# Patient Record
Sex: FEMALE | Race: BLACK OR AFRICAN AMERICAN | Employment: UNEMPLOYED | URBAN - METROPOLITAN AREA
[De-identification: names, ages, dates, MRNs, and addresses within clinical notes are randomized per-mention and may not be internally consistent; named-entity substitution may affect disease eponyms.]

---

## 2019-06-05 ENCOUNTER — OFFICE VISIT (OUTPATIENT)
Dept: NEUROLOGY | Age: 34
End: 2019-06-05

## 2019-06-05 VITALS
SYSTOLIC BLOOD PRESSURE: 128 MMHG | DIASTOLIC BLOOD PRESSURE: 84 MMHG | RESPIRATION RATE: 20 BRPM | HEIGHT: 61 IN | OXYGEN SATURATION: 98 % | HEART RATE: 104 BPM

## 2019-06-05 DIAGNOSIS — G43.111 INTRACTABLE MIGRAINE WITH AURA WITH STATUS MIGRAINOSUS: Primary | ICD-10-CM

## 2019-06-05 RX ORDER — BUTALBITAL, ACETAMINOPHEN AND CAFFEINE 300; 40; 50 MG/1; MG/1; MG/1
CAPSULE ORAL
Refills: 0 | COMMUNITY
Start: 2019-04-24 | End: 2021-07-08 | Stop reason: ALTCHOICE

## 2019-06-05 RX ORDER — ACETAMINOPHEN 500 MG
TABLET ORAL
COMMUNITY

## 2019-06-05 NOTE — PROGRESS NOTES
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 HPI:     Since 15 yo, patient has been having headaches lasting for weeks, diffuse,10/10, unknown trigger, Excedrin usually helps(+) nausea (+) vomiting (+) photophobia (+) phonophobia (+) blurred vision    During her 1st 4 pregnancies, patient did not have any issues and no headaches    However, with her 5th pregnancy (21 weeks) starting at 10 weeks, patient noted headaches daily. Takes Fioricet with no benefit. Patient had a period of 2 weeks without headache, but had a [place] shot and since then have headaches. ROS   (-) fever  (-) rash  All other systems reviewed and are negative    Past Medical Hx  Past Medical History:   Diagnosis Date    Anxiety     Headache     Nausea & vomiting     Visual disturbance        Social Hx  Social History     Socioeconomic History    Marital status: UNKNOWN     Spouse name: Not on file    Number of children: Not on file    Years of education: Not on file    Highest education level: Not on file       Family Hx  No family history on file. ALLERGIES  Allergies   Allergen Reactions    Flagyl [Metronidazole] Hives       CURRENT MEDS  Current Outpatient Medications   Medication Sig Dispense Refill    butalbital-acetaminophen-caff (FIORICET) -40 mg per capsule PLEASE SEE ATTACHED FOR DETAILED DIRECTIONS  0    prenatal vit-iron fumarate-fa 27 mg iron- 0.8 mg tab tablet TAKE 1 TABLET BY MOUTH EVERY DAY  10    acetaminophen (TYLENOL EXTRA STRENGTH) 500 mg tablet Take  by mouth every six (6) hours as needed for Pain. PREVIOUS WORKUP: (reviewed)  IMAGING:    CT Results (recent):  No results found for this or any previous visit.     MRI Results (recent):  No results found for this or any previous visit. IR Results (recent):  No results found for this or any previous visit. VAS/US Results (recent):  No results found for this or any previous visit. LABS (reviewed)  No results found for this or any previous visit. Physical Exam:     Visit Vitals  /84 (BP 1 Location: Left arm, BP Patient Position: Sitting)   Pulse (!) 104   Resp 20   Ht 5' 1\" (1.549 m)   SpO2 98%     General:  Alert, cooperative, no distress. (+) PU 20 weeks   Head:  Normocephalic, without obvious abnormality, atraumatic. Eyes:  Conjunctivae/corneas clear. Lungs:  Heart:   Non labored breathing  Regular rate and rhythm, no carotid bruits   Abdomen:   Soft, non-distended   Extremities: Extremities normal, atraumatic, no cyanosis or edema. Pulses: 2+ and symmetric all extremities. Skin: Skin color, texture, turgor normal. No rashes or lesions. Neurologic Exam     Gen: Attention normal             Language: naming, repetition, fluency normal             Memory: intact recent and remote memory  Cranial Nerves:  I: smell Not tested   II: visual fields Full to confrontation   II: pupils Equal, round, reactive to light   II: optic disc No papilledema   III,VII: ptosis none   III,IV,VI: extraocular muscles  Full ROM   V: mastication normal   V: facial light touch sensation  normal   VII: facial muscle function   symmetric   VIII: hearing symmetric   IX: soft palate elevation  normal   XI: trapezius strength  5/5   XI: sternocleidomastoid strength 5/5   XI: neck flexion strength  5/5   XII: tongue  midline     Motor: normal bulk and tone, no tremor              Strength: 5/5 all four extremities  Sensory: intact to LT, PP, vibration, and JPS  Reflexes: 2+ throughout; Down going toes  Coordination: Good FTN and HTS  Gait: normal gait for pregnancy           Impression:     Alejandro Dubose is a 34 y.o. female who  has a past medical history of Anxiety, Headache, Nausea & vomiting, and Visual disturbance. who is PU 20 weeks who since 15 yo, has been having headaches lasting for weeks, diffuse,10/10, unknown trigger, Excedrin usually helps(+) nausea (+) vomiting (+) photophobia (+) phonophobia (+) blurred vision. During her 1st 4 pregnancies, patient did not have any issues and no headaches. However, with her 5th pregnancy (21 weeks) starting at 10 weeks, patient noted headaches daily. Takes Fioricet with no benefit. Patient had a recent period of 2 weeks without headache, but had a [place] shot (to prevent pre term labor) and since then have daily headaches. Considerations include exacerbation of migraines due to medication side effect (Maricruz). RECOMMENDATIONS  1. I had a long discussion with patient. Discussed diagnosis, prognosis, pathophysiology and available treatment. All questions were answered. 2. Discussed because of her pregnancy, our options are limited. We are hoping that if this is truly from the medication, her headache should improve on its own. Patient has decided she will not get any more of the [facility] shot. 3. Patient can continue to try Promethazine for nausea and also can help control her migraine. 4. Can continue Fioricet and Tylenol prn  5. Advise to call me if headaches do not improve after a couple of weeks and will try her on a short course of Prednisone to break headache cylce if okay with her OB-gyne      Thank you for the consultation      Thad Potter MD  Diplomate, American Board of Psychiatry and Neurology  Diplomate, Neuromuscular Medicine  Diplomate, American Board of Electrodiagnostic Medicine        CC: No primary care provider on file.   Fax: None

## 2019-06-05 NOTE — PATIENT INSTRUCTIONS
Migraine Headache: Care Instructions  Your Care Instructions  Migraines are painful, throbbing headaches that often start on one side of the head. They may cause nausea and vomiting and make you sensitive to light, sound, or smell. Without treatment, migraines can last from 4 hours to a few days. Medicines can help prevent migraines or stop them after they have started. Your doctor can help you find which ones work best for you. Follow-up care is a key part of your treatment and safety. Be sure to make and go to all appointments, and call your doctor if you are having problems. It's also a good idea to know your test results and keep a list of the medicines you take. How can you care for yourself at home? · Do not drive if you have taken a prescription pain medicine. · Rest in a quiet, dark room until your headache is gone. Close your eyes, and try to relax or go to sleep. Don't watch TV or read. · Put a cold, moist cloth or cold pack on the painful area for 10 to 20 minutes at a time. Put a thin cloth between the cold pack and your skin. · Use a warm, moist towel or a heating pad set on low to relax tight shoulder and neck muscles. · Have someone gently massage your neck and shoulders. · Take your medicines exactly as prescribed. Call your doctor if you think you are having a problem with your medicine. You will get more details on the specific medicines your doctor prescribes. · Be careful not to take pain medicine more often than the instructions allow. You could get worse or more frequent headaches when the medicine wears off. To prevent migraines  · Keep a headache diary so you can figure out what triggers your headaches. Avoiding triggers may help you prevent headaches. Record when each headache began, how long it lasted, and what the pain was like.  (Was it throbbing, aching, stabbing, or dull?) Write down any other symptoms you had with the headache, such as nausea, flashing lights or dark spots, or sensitivity to bright light or loud noise. Note if the headache occurred near your period. List anything that might have triggered the headache. Triggers may include certain foods (chocolate, cheese, wine) or odors, smoke, bright light, stress, or lack of sleep. · If your doctor has prescribed medicine for your migraines, take it as directed. You may have medicine that you take only when you get a migraine and medicine that you take all the time to help prevent migraines. ? If your doctor has prescribed medicine for when you get a headache, take it at the first sign of a migraine, unless your doctor has given you other instructions. ? If your doctor has prescribed medicine to prevent migraines, take it exactly as prescribed. Call your doctor if you think you are having a problem with your medicine. · Find healthy ways to deal with stress. Migraines are most common during or right after stressful times. Take time to relax before and after you do something that has caused a migraine in the past.  · Try to keep your muscles relaxed by keeping good posture. Check your jaw, face, neck, and shoulder muscles for tension. Try to relax them. When you sit at a desk, change positions often. And make sure to stretch for 30 seconds each hour. · Get plenty of sleep and exercise. · Eat meals on a regular schedule. Avoid foods and drinks that often trigger migraines. These include chocolate, alcohol (especially red wine and port), aspartame, monosodium glutamate (MSG), and some additives found in foods (such as hot dogs, riddle, cold cuts, aged cheeses, and pickled foods). · Limit caffeine. Don't drink too much coffee, tea, or soda. But don't quit caffeine suddenly. That can also give you migraines. · Do not smoke or allow others to smoke around you. If you need help quitting, talk to your doctor about stop-smoking programs and medicines. These can increase your chances of quitting for good.   · If you are taking birth control pills or hormone therapy, talk to your doctor about whether they are triggering your migraines. When should you call for help? Call 911 anytime you think you may need emergency care. For example, call if:    · You have signs of a stroke. These may include:  ? Sudden numbness, paralysis, or weakness in your face, arm, or leg, especially on only one side of your body. ? Sudden vision changes. ? Sudden trouble speaking. ? Sudden confusion or trouble understanding simple statements. ? Sudden problems with walking or balance. ? A sudden, severe headache that is different from past headaches.    Call your doctor now or seek immediate medical care if:    · You have new or worse nausea and vomiting.     · You have a new or higher fever.     · Your headache gets much worse.    Watch closely for changes in your health, and be sure to contact your doctor if:    · You are not getting better after 2 days (48 hours). Where can you learn more? Go to http://madison-gerald.info/. Enter B175 in the search box to learn more about \"Migraine Headache: Care Instructions. \"  Current as of: Goldie 3, 2018  Content Version: 11.9  © 1991-9231 [employer]. Care instructions adapted under license by [employer] (which disclaims liability or warranty for this information). If you have questions about a medical condition or this instruction, always ask your healthcare professional. [address] any warranty or liability for your use of this information.

## 2019-06-05 NOTE — LETTER
6/5/2019 11:36 AM 
 
Patient:  Larry Phipps YOB: 1985 Date of Visit: 6/5/2019 Dear Saray Shepard MD 
[address] [facility] [id number] VIA Facsimile: [phone] 
 : Thank you for referring Ms. Larry Phipps to me for evaluation/treatment. Below are the relevant portions of my assessment and plan of care. If you have questions, please do not hesitate to call me. I look forward to following Ms. Loya along with you. Sincerely, Dez Damon MD

## 2019-06-05 NOTE — PROGRESS NOTES
Patient is here for migraines,    She is currently pregnant (21 weeks). She has been getting a migraine for a while now, and medications do not seem to help. They came back up when she was 10 weeks.

## 2021-03-29 ENCOUNTER — HOSPITAL ENCOUNTER (EMERGENCY)
Age: 36
Discharge: HOME | End: 2021-03-29
Attending: EMERGENCY MEDICINE
Payer: MEDICAID

## 2021-03-29 VITALS
HEART RATE: 89 BPM | WEIGHT: 157 LBS | HEIGHT: 61 IN | SYSTOLIC BLOOD PRESSURE: 144 MMHG | DIASTOLIC BLOOD PRESSURE: 79 MMHG | OXYGEN SATURATION: 99 % | BODY MASS INDEX: 29.64 KG/M2 | TEMPERATURE: 98.1 F | RESPIRATION RATE: 20 BRPM

## 2021-03-29 DIAGNOSIS — R55 VASOVAGAL REACTION: Primary | ICD-10-CM

## 2021-03-29 DIAGNOSIS — F45.8 HYPERVENTILATION SYNDROME: ICD-10-CM

## 2021-03-29 LAB
ANION GAP SERPL CALC-SCNC: 10 MMOL/L (ref 5–15)
APPEARANCE UR: CLEAR
BACTERIA URNS QL MICRO: NEGATIVE /HPF
BASOPHILS # BLD: 0 K/UL (ref 0–0.1)
BASOPHILS NFR BLD: 0 % (ref 0–1)
BILIRUB UR QL: NEGATIVE
BUN SERPL-MCNC: 14 MG/DL (ref 6–20)
BUN/CREAT SERPL: 16 (ref 12–20)
CA-I BLD-MCNC: 9 MG/DL (ref 8.5–10.1)
CHLORIDE SERPL-SCNC: 101 MMOL/L (ref 97–108)
CO2 SERPL-SCNC: 27 MMOL/L (ref 21–32)
COLOR UR: YELLOW
CREAT SERPL-MCNC: 0.88 MG/DL (ref 0.55–1.02)
DIFFERENTIAL METHOD BLD: NORMAL
EOSINOPHIL # BLD: 0.1 K/UL (ref 0–0.4)
EOSINOPHIL NFR BLD: 1 % (ref 0–7)
EPITH CASTS URNS QL MICRO: ABNORMAL /LPF
ERYTHROCYTE [DISTWIDTH] IN BLOOD BY AUTOMATED COUNT: 12.9 % (ref 11.5–14.5)
GLUCOSE SERPL-MCNC: 105 MG/DL (ref 65–100)
GLUCOSE UR STRIP.AUTO-MCNC: NEGATIVE MG/DL
HCG UR QL: NEGATIVE
HCT VFR BLD AUTO: 36.5 % (ref 35–47)
HGB BLD-MCNC: 11.9 G/DL (ref 11.5–16)
HGB UR QL STRIP: NEGATIVE
IMM GRANULOCYTES # BLD AUTO: 0 K/UL (ref 0–0.04)
IMM GRANULOCYTES NFR BLD AUTO: 0 % (ref 0–0.5)
KETONES UR QL STRIP.AUTO: NEGATIVE MG/DL
LEUKOCYTE ESTERASE UR QL STRIP.AUTO: NEGATIVE
LYMPHOCYTES # BLD: 1.8 K/UL (ref 0.8–3.5)
LYMPHOCYTES NFR BLD: 22 % (ref 12–49)
MCH RBC QN AUTO: 28.6 PG (ref 26–34)
MCHC RBC AUTO-ENTMCNC: 32.6 G/DL (ref 30–36.5)
MCV RBC AUTO: 87.7 FL (ref 80–99)
MONOCYTES # BLD: 0.4 K/UL (ref 0–1)
MONOCYTES NFR BLD: 5 % (ref 5–13)
NEUTS SEG # BLD: 5.8 K/UL (ref 1.8–8)
NEUTS SEG NFR BLD: 72 % (ref 32–75)
NITRITE UR QL STRIP.AUTO: NEGATIVE
PH UR STRIP: 7 [PH] (ref 5–8)
PLATELET # BLD AUTO: 289 K/UL (ref 150–400)
PMV BLD AUTO: 10.5 FL (ref 8.9–12.9)
POTASSIUM SERPL-SCNC: 4 MMOL/L (ref 3.5–5.1)
PROT UR STRIP-MCNC: NEGATIVE MG/DL
RBC # BLD AUTO: 4.16 M/UL (ref 3.8–5.2)
RBC #/AREA URNS HPF: ABNORMAL /HPF (ref 0–5)
SODIUM SERPL-SCNC: 138 MMOL/L (ref 136–145)
SP GR UR REFRACTOMETRY: 1 (ref 1–1.03)
UROBILINOGEN UR QL STRIP.AUTO: 0.1 EU/DL (ref 0.2–1)
WBC # BLD AUTO: 8 K/UL (ref 3.6–11)
WBC URNS QL MICRO: ABNORMAL /HPF (ref 0–4)

## 2021-03-29 PROCEDURE — 99283 EMERGENCY DEPT VISIT LOW MDM: CPT

## 2021-03-29 PROCEDURE — 93005 ELECTROCARDIOGRAM TRACING: CPT

## 2021-03-29 PROCEDURE — 81025 URINE PREGNANCY TEST: CPT

## 2021-03-29 PROCEDURE — 81003 URINALYSIS AUTO W/O SCOPE: CPT

## 2021-03-29 PROCEDURE — 85025 COMPLETE CBC W/AUTO DIFF WBC: CPT

## 2021-03-29 PROCEDURE — 80048 BASIC METABOLIC PNL TOTAL CA: CPT

## 2021-03-29 PROCEDURE — 36415 COLL VENOUS BLD VENIPUNCTURE: CPT

## 2021-03-29 NOTE — LETTER
NOTIFICATION RETURN TO WORK / SCHOOL 
 
3/29/2021 11:49 PM 
 
Ms. Vale Helms [address] [address] To Whom It May Concern: 
 
Vale Helms is currently under the care of [address] [place] [address]. She will return to work/school on: 3/31/21 Vale Helms may return to work/school with the following restrictions: n/a If there are questions or concerns please have the patient contact our office.  
 
 
 
Sincerely, 
 
 
Willim Closs, RN

## 2021-03-30 NOTE — ED PROVIDER NOTES
EMERGENCY DEPARTMENT HISTORY AND PHYSICAL EXAM      Date: 3/29/2021  Patient Name: Mehran Santos    History of Presenting Illness     Chief Complaint   Patient presents with    Neck Pain    Headache    Numbness       History Provided By: Patient    HPI: Mehran Santos, 37 y.o. female   presents to the ED with cc of dizziness and numbness. Patient states that she had a sudden onset of dizziness while she was driving about an hour prior to arrival.  The dizziness is described as a lightheaded feeling with neck stiffness and numbness. Numbness is localized periorbital and bilateral hands. Patient denies anxiety but relates history of hyperventilation where she had shortness of breath, chest pain, and rapid breathing. Patient is currently having mild headache without visual changes or neuro deficit. Patient denies use of EtOH or drug. Patient has history of anemia that required iron infusion in past      PCP: None    No current facility-administered medications on file prior to encounter. No current outpatient medications on file prior to encounter. Past History     Past Medical History:  Past Medical History:   Diagnosis Date    Anxiety     Chronic headache     Intermittent palpitations        Past Surgical History:  History reviewed. No pertinent surgical history. Family History:  History reviewed. No pertinent family history. Social History:  Social History     Tobacco Use    Smoking status: Not on file   Substance Use Topics    Alcohol use: Not on file    Drug use: Not on file       Allergies: Allergies   Allergen Reactions    Flagyl [Metronidazole] Rash         Review of Systems   Review of Systems   Constitutional: Negative for activity change, appetite change, chills and fever. HENT: Negative for sore throat. Eyes: Negative for discharge. Respiratory: Positive for shortness of breath. Cardiovascular: Positive for chest pain. Gastrointestinal: Negative for nausea. Endocrine: Negative for polyuria. Genitourinary: Negative for difficulty urinating. Musculoskeletal: Negative for arthralgias. Skin: Negative for rash. Neurological: Positive for headaches. Hematological: Negative for adenopathy. Psychiatric/Behavioral: Negative for suicidal ideas. All other systems reviewed and are negative. Physical Exam   Physical Exam  Vitals signs and nursing note reviewed. Constitutional:       Appearance: Normal appearance. HENT:      Head: Normocephalic and atraumatic. Nose: Nose normal.      Mouth/Throat:      Mouth: Mucous membranes are moist.      Pharynx: Oropharynx is clear. Eyes:      Extraocular Movements: Extraocular movements intact. Conjunctiva/sclera: Conjunctivae normal.      Pupils: Pupils are equal, round, and reactive to light. Neck:      Musculoskeletal: Neck supple. No neck rigidity or muscular tenderness. Cardiovascular:      Rate and Rhythm: Normal rate and regular rhythm. Heart sounds: Normal heart sounds. Pulmonary:      Effort: Pulmonary effort is normal.      Breath sounds: Normal breath sounds. Abdominal:      General: Abdomen is flat. Bowel sounds are normal.      Palpations: Abdomen is soft. Musculoskeletal:      Right lower leg: No edema. Left lower leg: No edema. Lymphadenopathy:      Cervical: No cervical adenopathy. Skin:     General: Skin is warm and dry. Neurological:      General: No focal deficit present. Mental Status: She is alert and oriented to person, place, and time. Cranial Nerves: No cranial nerve deficit. Motor: No weakness.       Gait: Gait normal.   Psychiatric:         Mood and Affect: Mood normal.         Diagnostic Study Results     Labs -     Recent Results (from the past 12 hour(s))   CBC WITH AUTOMATED DIFF    Collection Time: 03/29/21 11:00 PM   Result Value Ref Range    WBC 8.0 3.6 - 11.0 K/uL    RBC 4.16 3.80 - 5.20 M/uL    HGB 11.9 11.5 - 16.0 g/dL    HCT 36.5 35.0 - 47.0 %    MCV 87.7 80.0 - 99.0 FL    MCH 28.6 26.0 - 34.0 PG    MCHC 32.6 30.0 - 36.5 g/dL    RDW 12.9 11.5 - 14.5 %    PLATELET 545 800 - 088 K/uL    MPV 10.5 8.9 - 12.9 FL    NEUTROPHILS 72 32 - 75 %    LYMPHOCYTES 22 12 - 49 %    MONOCYTES 5 5 - 13 %    EOSINOPHILS 1 0 - 7 %    BASOPHILS 0 0 - 1 %    IMMATURE GRANULOCYTES 0 0.0 - 0.5 %    ABS. NEUTROPHILS 5.8 1.8 - 8.0 K/UL    ABS. LYMPHOCYTES 1.8 0.8 - 3.5 K/UL    ABS. MONOCYTES 0.4 0.0 - 1.0 K/UL    ABS. EOSINOPHILS 0.1 0.0 - 0.4 K/UL    ABS. BASOPHILS 0.0 0.0 - 0.1 K/UL    ABS. IMM.  GRANS. 0.0 0.00 - 0.04 K/UL    DF AUTOMATED     METABOLIC PANEL, BASIC    Collection Time: 03/29/21 11:00 PM   Result Value Ref Range    Sodium 138 136 - 145 mmol/L    Potassium 4.0 3.5 - 5.1 mmol/L    Chloride 101 97 - 108 mmol/L    CO2 27 21 - 32 mmol/L    Anion gap 10 5 - 15 mmol/L    Glucose 105 (H) 65 - 100 mg/dL    BUN 14 6 - 20 mg/dL    Creatinine 0.88 0.55 - 1.02 mg/dL    BUN/Creatinine ratio 16 12 - 20      GFR est AA >60 >60 ml/min/1.73m2    GFR est non-AA >60 >60 ml/min/1.73m2    Calcium 9.0 8.5 - 10.1 mg/dL   URINALYSIS W/ RFLX MICROSCOPIC    Collection Time: 03/29/21 11:00 PM   Result Value Ref Range    Color Yellow      Appearance Clear Clear      Specific gravity 1.005 1.003 - 1.030      pH (UA) 7.0 5.0 - 8.0      Protein Negative Negative mg/dL    Glucose Negative Negative mg/dL    Ketone Negative Negative mg/dL    Bilirubin Negative Negative      Blood Negative Negative      Urobilinogen 0.1 (L) 0.2 - 1.0 EU/dL    Nitrites Negative Negative      Leukocyte Esterase Negative Negative      WBC 0-4 0 - 4 /hpf    RBC 0-5 0 - 5 /hpf    Epithelial cells Few Few /lpf    Bacteria Negative Negative /hpf   HCG URINE, QL    Collection Time: 03/29/21 11:00 PM   Result Value Ref Range    HCG urine, QL Negative Negative         Radiologic Studies -   No orders to display     CT Results  (Last 48 hours)    None        CXR Results  (Last 48 hours)    None            Medical Decision Making   I am the first provider for this patient. I reviewed the vital signs, available nursing notes, past medical history, past surgical history, family history and social history. Vital Signs-Reviewed the patient's vital signs. Patient Vitals for the past 12 hrs:   Temp Pulse Resp BP SpO2   03/29/21 2208 98.1 °F (36.7 °C) 89 20 (!) 144/79 99 %       Records Reviewed:     Provider Notes (Medical Decision Making):       ED Course:   Initial assessment performed. The patients presenting problems have been discussed, and they are in agreement with the care plan formulated and outlined with them. I have encouraged them to ask questions as they arise throughout their visit. ED Course as of Mar 29 2341   Mon Mar 29, 2021   2319 EKG normal sinus rhythm at 87 normal QRS QT normal axis no ectopy no ischemic changes no STEMI    [SK]      ED Course User Index  [SK] Haily Henson MD     Patient feels better. Symptoms resolved. Neuro no gross nonfocal.  No respiratory distress. PROCEDURES      Disposition: Condition stable   DC- Adult Discharges: All of the diagnostic tests were reviewed and questions answered. Diagnosis, care plan and treatment options were discussed. understand instructions and will follow up as directed. The patients results have been reviewed with them. They have been counseled regarding their diagnosis. The patient verbally convey understanding and agreement of the signs, symptoms, diagnosis, treatment and prognosis and additionally agrees to follow up as recommended. They also agree with the care-plan and convey that all of their questions have been answered. I have also put together some discharge instructions for them that include: 1) educational information regarding their diagnosis, 2) how to care for their diagnosis at home, as well a 3) list of reasons why they would want to return to the ED prior to their follow-up appointment, should their condition change. PLAN:  1. There are no discharge medications for this patient. 2.   Follow-up Information     Follow up With Specialties Details Why Contact Info    Follow up with your primary care physician  Schedule an appointment as soon as possible for a visit in 3 days As needed         Return to ED if worse     Diagnosis     Clinical Impression:   1. Vasovagal reaction    2. Hyperventilation syndrome        Please note that this dictation was completed with [employer], the computer voice recognition software. Quite often unanticipated grammatical, syntax, homophones, and other interpretive errors are inadvertently transcribed by the computer software. Please disregard these errors. Please excuse any errors that have escaped final proofreading. Thank you.

## 2021-05-24 ENCOUNTER — OFFICE VISIT (OUTPATIENT)
Dept: FAMILY MEDICINE CLINIC | Age: 36
End: 2021-05-24
Payer: MEDICAID

## 2021-05-24 VITALS
HEIGHT: 63 IN | TEMPERATURE: 97.7 F | SYSTOLIC BLOOD PRESSURE: 120 MMHG | OXYGEN SATURATION: 97 % | BODY MASS INDEX: 27.91 KG/M2 | WEIGHT: 157.5 LBS | DIASTOLIC BLOOD PRESSURE: 70 MMHG | HEART RATE: 76 BPM

## 2021-05-24 DIAGNOSIS — Z11.59 SCREENING FOR VIRAL DISEASE: ICD-10-CM

## 2021-05-24 DIAGNOSIS — E66.3 OVERWEIGHT WITH BODY MASS INDEX (BMI) OF 28 TO 28.9 IN ADULT: ICD-10-CM

## 2021-05-24 DIAGNOSIS — Z13.220 SCREENING FOR LIPOID DISORDERS: ICD-10-CM

## 2021-05-24 DIAGNOSIS — R42 DIZZINESS: Primary | ICD-10-CM

## 2021-05-24 DIAGNOSIS — F41.0 PANIC: ICD-10-CM

## 2021-05-24 PROCEDURE — 99204 OFFICE O/P NEW MOD 45 MIN: CPT | Performed by: FAMILY MEDICINE

## 2021-05-24 RX ORDER — IBUPROFEN 200 MG
400 TABLET ORAL AS NEEDED
COMMUNITY

## 2021-05-24 RX ORDER — SERTRALINE HYDROCHLORIDE 50 MG/1
50 TABLET, FILM COATED ORAL DAILY
Qty: 30 TABLET | Refills: 1 | Status: SHIPPED | OUTPATIENT
Start: 2021-05-24 | End: 2021-07-08 | Stop reason: ALTCHOICE

## 2021-05-24 NOTE — PROGRESS NOTES
Chief Complaint   Patient presents with   Sukhjinder Carbajal [facility]    Dizziness     x4 months, feels pressure in her head towards the front, no better with med, Panic attacks? 1. Have you been to the ER, urgent care clinic since your last visit? Hospitalized since your last visit? Yes When: 2/2021 Where: [facility] ED Reason for visit: Panic attack/headache. 2. Have you seen or consulted any other health care providers outside of the [address] [place] [address] Jason since your last visit? Include any pap smears or colon screening.  No

## 2021-05-24 NOTE — PROGRESS NOTES
Subjective  Chief Complaint   Patient presents with    Establish Care    Dizziness     x4 months, feels pressure in her head towards the front, no better with med, Panic attacks? HPI:  Karen Gonzales is a 37 y.o. female. About a year ago when she moved into her current home she started having panic attacks. They are occurring about twice per month. Has been to the ED 3 times this year but nothing is found. The dizziness and pressure in her head has been present for about a year. She has a h/o migraines but this is different. Has not had a migraine since being pregnant 2 yrs ago. The dizziness and pressure is daily. Ibuprofen helps temporarily. Symptoms are present in all positions but gets worse with laying down. Denies sinus congestion. Vision is blurry with headaches. Eyes last checked about a year ago. About 3 days/week she will get some numbness down her left side of her face with the dizziness. 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 She has 5 children that are all virtual right now.      Past Medical History:   Diagnosis Date    Anemia     Had to get iron infusions while pregnant    Anxiety     Chronic headache     Intermittent palpitations      Family History   Problem Relation Age of Onset    Anemia Mother     No Known Problems Father     Hypertension Maternal Grandmother     High Cholesterol Maternal Grandmother      Social History     Socioeconomic History    Marital status:      Spouse name: Not on file    Number of children: Not on file    Years of education: Not on file    Highest education level: Not on file   Occupational History    Not on file   Tobacco Use    Smoking status: Never Smoker    Smokeless tobacco: Never Used   Vaping Use    Vaping Use: Former    Substances: THC, CBD    Devices: Disposable   Substance and Sexual Activity    Alcohol use: Not Currently    Drug use: Not Currently    Sexual activity: Not on file   Other Topics Concern    Not on file   Social History Narrative    Not on file     Social Determinants of Health     Financial Resource Strain:     Difficulty of Paying Living Expenses:    Food Insecurity:     Worried About Running Out of Food in the Last Year:     [address] in the Last Year:    Transportation Needs:     Lack of Transportation (Medical):  Lack of Transportation (Non-Medical):    Physical Activity:     Days of Exercise per Week:     Minutes of Exercise per Session:    Stress:     Feeling of Stress :    Social Connections:     Frequency of Communication with Friends and Family:     Frequency of Social Gatherings with Friends and Family:     Attends Religion Services:     Active Member of Clubs or Organizations:     Attends Club or Organization Meetings:     Marital Status:    Intimate Partner Violence:     Fear of Current or Ex-Partner:     Emotionally Abused:     Physically Abused:     Sexually Abused:      Current Outpatient Medications on File Prior to Visit   Medication Sig Dispense Refill    ibuprofen (MOTRIN) 200 mg tablet Take 400 mg by mouth as needed (Headache). No current facility-administered medications on file prior to visit. Allergies   Allergen Reactions    Flagyl [Metronidazole] Rash       Objective  Visit Vitals  /70 (BP 1 Location: Left upper arm, BP Patient Position: Sitting)   Pulse 76   Temp 97.7 °F (36.5 °C) (Temporal)   Ht 5' 2.5\" (1.588 m)   Wt 157 lb 8 oz (71.4 kg)   SpO2 97%   BMI 28.35 kg/m²     Physical Exam  Constitutional:       Appearance: Normal appearance. She is overweight. HENT:      Head: Normocephalic and atraumatic. Eyes:      Extraocular Movements: Extraocular movements intact. Pupils: Pupils are equal, round, and reactive to light. Pulmonary:      Effort: Pulmonary effort is normal.   Musculoskeletal:      Cervical back: Normal range of motion. Neurological:      General: No focal deficit present.       Mental Status: She is alert and oriented to person, place, and time. Cranial Nerves: Cranial nerves are intact. No cranial nerve deficit or facial asymmetry. Sensory: Sensation is intact. Motor: Motor function is intact. No weakness, tremor or pronator drift. Coordination: Coordination is intact. Romberg sign negative. Coordination normal. Finger-Nose-Finger Test and Heel to [facility] Test normal. Rapid alternating movements normal.      Gait: Gait is intact. Gait and tandem walk normal.      Deep Tendon Reflexes: Reflexes normal.      Reflex Scores:       Patellar reflexes are 2+ on the right side and 2+ on the left side. Psychiatric:         Mood and Affect: Mood normal.         Behavior: Behavior normal.          Assessment & Plan    ICD-10-CM ICD-9-CM    1. Dizziness  R42 780.4 VITAMIN D, 25 HYDROXY   2. Panic  F41.0 300.01 THYROID CASCADE PROFILE      sertraline (ZOLOFT) 50 mg tablet   3. Overweight with body mass index (BMI) of 28 to 28.9 in adult  E66.3 278.02     Z68.28 V85.24    4. Screening for lipoid disorders  Z13.220 V77.91 LIPID PANEL      METABOLIC PANEL, COMPREHENSIVE   5. Screening for viral disease  Z11.59 V73.99 HCV AB W/RFLX TO YUMI     Diagnoses and all orders for this visit:    1. Dizziness  -     VITAMIN D, 25 HYDROXY  I am checking vitamin D and TSH levels along with CMP and lipids. I reviewed her CBC that was just checked recently from the emergency department at [address] [place] [address]. I am hopeful that some of these headache and dizziness symptoms will improve with the SSRI therapy. I will see her back in the office in 6 weeks if symptoms are all not fully to goal.    2. Panic  -     THYROID CASCADE PROFILE  -     sertraline (ZOLOFT) 50 mg tablet; Take 1 Tablet by mouth daily. We are checking thyroid level and starting sertraline as listed. We reviewed how to take this medication and what to expect.   I will see her back in 6 weeks if not fully to goal.    3. Overweight with body mass index (BMI) of 28 to 28.9 in adult  Checking labs as listed. We will discuss lifestyle improvements more at future visit. I am checking her lipid panel with the other labs due to the overweight history. 4. Screening for lipoid disorders  -     LIPID PANEL  -     METABOLIC PANEL, COMPREHENSIVE    5. Screening for viral disease  -     HCV AB W/RFLX TO YUMI      Follow-up and Dispositions    · Return in about 6 weeks (around 7/5/2021) for f/u panic and dizziness.  Fouzia Kilpatrick MD

## 2021-05-28 LAB
25(OH)D3+25(OH)D2 SERPL-MCNC: 17.3 NG/ML (ref 30–100)
ALBUMIN SERPL-MCNC: 4.5 G/DL (ref 3.8–4.8)
ALBUMIN/GLOB SERPL: 1.6 {RATIO} (ref 1.2–2.2)
ALP SERPL-CCNC: 106 IU/L (ref 48–121)
ALT SERPL-CCNC: 11 IU/L (ref 0–32)
AST SERPL-CCNC: 14 IU/L (ref 0–40)
BILIRUB SERPL-MCNC: 0.5 MG/DL (ref 0–1.2)
BUN SERPL-MCNC: 15 MG/DL (ref 6–20)
BUN/CREAT SERPL: 20 (ref 9–23)
CALCIUM SERPL-MCNC: 9.4 MG/DL (ref 8.7–10.2)
CHLORIDE SERPL-SCNC: 105 MMOL/L (ref 96–106)
CHOLEST SERPL-MCNC: 174 MG/DL (ref 100–199)
CO2 SERPL-SCNC: 22 MMOL/L (ref 20–29)
CREAT SERPL-MCNC: 0.75 MG/DL (ref 0.57–1)
GLOBULIN SER CALC-MCNC: 2.9 G/DL (ref 1.5–4.5)
GLUCOSE SERPL-MCNC: 94 MG/DL (ref 65–99)
HCV AB S/CO SERPL IA: <0.1 S/CO RATIO (ref 0–0.9)
HCV AB SERPL QL IA: NORMAL
HDLC SERPL-MCNC: 50 MG/DL
LDLC SERPL CALC-MCNC: 114 MG/DL (ref 0–99)
POTASSIUM SERPL-SCNC: 4.2 MMOL/L (ref 3.5–5.2)
PROT SERPL-MCNC: 7.4 G/DL (ref 6–8.5)
SODIUM SERPL-SCNC: 139 MMOL/L (ref 134–144)
TRIGL SERPL-MCNC: 52 MG/DL (ref 0–149)
TSH SERPL DL<=0.005 MIU/L-ACNC: 0.7 UIU/ML (ref 0.45–4.5)
VLDLC SERPL CALC-MCNC: 10 MG/DL (ref 5–40)

## 2021-05-30 DIAGNOSIS — E55.9 VITAMIN D DEFICIENCY: Primary | ICD-10-CM

## 2021-05-30 RX ORDER — ACETAMINOPHEN 500 MG
2000 TABLET ORAL DAILY
Qty: 90 CAPSULE | Refills: 3 | Status: SHIPPED | OUTPATIENT
Start: 2021-05-30 | End: 2022-10-31 | Stop reason: ALTCHOICE

## 2021-05-30 RX ORDER — ERGOCALCIFEROL 1.25 MG/1
50000 CAPSULE ORAL
Qty: 8 CAPSULE | Refills: 0 | Status: SHIPPED | OUTPATIENT
Start: 2021-05-30 | End: 2022-10-31

## 2021-07-08 ENCOUNTER — VIRTUAL VISIT (OUTPATIENT)
Dept: FAMILY MEDICINE CLINIC | Age: 36
End: 2021-07-08
Payer: MEDICAID

## 2021-07-08 DIAGNOSIS — R07.9 CHEST PAIN, UNSPECIFIED TYPE: ICD-10-CM

## 2021-07-08 DIAGNOSIS — F41.0 PANIC: Primary | ICD-10-CM

## 2021-07-08 DIAGNOSIS — R42 DIZZINESS: ICD-10-CM

## 2021-07-08 PROCEDURE — 99214 OFFICE O/P EST MOD 30 MIN: CPT | Performed by: FAMILY MEDICINE

## 2021-07-08 NOTE — PROGRESS NOTES
Chief Complaint   Patient presents with    Follow-up     6 week f/u on panic attacks and dizziness, patient went to [facility] ER and had CT done showed Idopathic Hypertension which has made her anxiety worse worring about this new DX    1. Have you been to the ER, urgent care clinic since your last visit? Hospitalized since your last visit? Yes patient was seen at [facility] ER had CT done     2. Have you seen or consulted any other health care providers outside of the [address] [place] [address] since your last visit? Include any pap smears or colon screening.  Yes patient has seen neurology for Idiopathic hypertension     Patient would like to use # [phone] for her visit

## 2021-07-08 NOTE — PROGRESS NOTES
Consent: Annette Chavez, who was seen by synchronous (real-time) audio-video technology, and/or her healthcare decision maker, is aware that this patient-initiated, Telehealth encounter on 7/8/2021 is a billable service, with coverage as determined by her insurance carrier. She is aware that she may receive a bill and has provided verbal consent to proceed: YES-Consent obtained within past 12 months        712  Subjective:   Annette Chavez is a 37 y.o. female who was seen for Follow-up (6 week f/u on panic attacks and dizziness, patient went to [facility] ER and had CT done showed Idopathic Hypertension which has made her anxiety worse worring about this new DX )      Patient is following up on the anxiety episodes and dizziness that she had been seen for 6 weeks ago. She started the sertraline as directed and took it for about 2 to 3 weeks. She noticed that she was having increased episodes of panic in total about 4 during that time. She ended up going to the emergency department during that time and reports to us that a CT scan showed increased pressure on the brain. She since saw the neurologist, Dr. Ry Hood.  He has her scheduled for a spinal tap. She does not feel comfortable with this procedure. She has also seen cardiology for some chest pain that she was having. She just completed the Holter monitor and mailed that back in. She will also be having a stress test.  As for the anxiety itself, it has improved somewhat since coming off the sertraline. She has only had 2 mild episodes in the last few weeks that were manageable. She would like to remain off any medication for the anxiety at this time. Prior to Admission medications    Medication Sig Start Date End Date Taking? Authorizing Provider   ergocalciferol (ERGOCALCIFEROL) 1,250 mcg (50,000 unit) capsule Take 1 Capsule by mouth every seven (7) days. After 8 weeks, convert to 2000 unit capsule daily.  5/30/21  Yes Lucy Garcia MD ibuprofen (MOTRIN) 200 mg tablet Take 400 mg by mouth as needed (Headache). Yes Provider, Historical   acetaminophen (TYLENOL EXTRA STRENGTH) 500 mg tablet Take  by mouth every six (6) hours as needed for Pain. Yes Provider, Historical   cholecalciferol (VITAMIN D3) (2,000 UNITS /50 MCG) cap capsule Take 1 Capsule by mouth daily. 5/30/21   Soila Minor MD   sertraline (ZOLOFT) 50 mg tablet Take 1 Tablet by mouth daily. Patient not taking: Reported on 7/8/2021 5/24/21 7/8/21  Soila Minor MD   butalbital-acetaminophen-caff (FIORICET) -40 mg per capsule PLEASE SEE ATTACHED FOR DETAILED DIRECTIONS  Patient not taking: Reported on 7/8/2021 4/24/19 7/8/21  Provider, Historical   prenatal vit-iron fumarate-fa 27 mg iron- 0.8 mg tab tablet TAKE 1 TABLET BY MOUTH EVERY DAY  Patient not taking: Reported on 7/8/2021 3/19/19 7/8/21  Provider, Historical     Allergies   Allergen Reactions    Flagyl [Metronidazole] Hives    Flagyl [Metronidazole] Rash     Patient Active Problem List    Diagnosis    Vitamin D deficiency    Overweight with body mass index (BMI) of 28 to 28.9 in adult    Panic       Objective:   Vital Signs: (As obtained by patient/caregiver at home)  There were no vitals taken for this visit.      [INSTRUCTIONS:  \"[x]\" Indicates a positive item  \"[]\" Indicates a negative item  -- DELETE ALL ITEMS NOT EXAMINED]    Constitutional: [x] Appears well-developed and well-nourished [x] No apparent distress      [] Abnormal -     Mental status: [x] Alert and awake  [x] Oriented to person/place/time [x] Able to follow commands    [] Abnormal -     Eyes:   EOM    [x]  Normal    [] Abnormal -   Sclera  [x]  Normal    [] Abnormal -          Discharge [x]  None visible   [] Abnormal -     HENT: [x] Normocephalic, atraumatic  [] Abnormal -   [] Mouth/Throat: Mucous membranes are moist    External Ears [] Normal  [] Abnormal -    Neck: [x] No visualized mass [] Abnormal -     Pulmonary/Chest: [x] Respiratory effort normal   [x] No visualized signs of difficulty breathing or respiratory distress        [] Abnormal -        Neurological:        [x] No Facial Asymmetry (Cranial nerve 7 motor function) (limited exam due to video visit)          [x] No gaze palsy        [] Abnormal -          Skin:        [x] No significant exanthematous lesions or discoloration noted on facial skin         [] Abnormal -            Psychiatric:       [x] Normal Affect [] Abnormal -        [x] No Hallucinations    Other pertinent observable physical exam findings:-              Assessment & Plan:   Diagnoses and all orders for this visit:    1. Panic    2. Dizziness    3. Chest pain, unspecified type    We attempted today to get a copy of the CT report and emergency department encounter note to no avail. We are going to call the neurology office and request that note. I have reviewed the note from cardiology which is in her chart here today. I agree that if the anxiety symptoms are manageable at this time that it would be better to avoid starting any new medication. My concern would be that the potential effect could confuse the work-up or other issues. I did review with her what it means to have a spinal tap and how the similarities overlie from when she had her epidurals during her 5 childbirth episodes. We also reviewed how this is the only true way to get a reading for exactly how much pressure is occurring within her central nervous system. This has helped ease her anxiety over having the procedure. She will let me know if she has any other concerns that she would like to discuss. On this date 7/8/2021 I have spent 32 minutes reviewing previous notes, test results and face to face with the patient discussing the diagnosis and treatment plan. We discussed the expected course, resolution and complications of the diagnosis(es) in detail.   Medication risks, benefits, costs, interactions, and alternatives were discussed as indicated. I advised her to contact the office if her condition worsens, changes or fails to improve as anticipated. She expressed understanding with the diagnosis(es) and plan. Kim Sow is a 37 y.o. female being evaluated by a video visit encounter for concerns as above. A caregiver was present when appropriate. Due to this being a TeleHealth encounter (During [facility]- public health emergency), evaluation of the following organ systems was limited: Vitals/Constitutional/EENT/Resp/CV/GI//MS/Neuro/Skin/Heme-Lymph-Imm. Pursuant to the emergency declaration under the [facility], 1135 waiver authority and the [employer] Act, this Virtual  Visit was conducted, with patient's (and/or legal guardian's) consent, to reduce the patient's risk of exposure to COVID-19 and provide necessary medical care. Services were provided through a video synchronous discussion virtually to substitute for in-person clinic visit. Patient and provider were located at their individual homes.         Nish Betts MD

## 2022-03-18 PROBLEM — E55.9 VITAMIN D DEFICIENCY: Status: ACTIVE | Noted: 2021-05-30

## 2022-03-19 PROBLEM — E66.3 OVERWEIGHT WITH BODY MASS INDEX (BMI) OF 28 TO 28.9 IN ADULT: Status: ACTIVE | Noted: 2021-05-24

## 2022-03-19 PROBLEM — F41.0 PANIC: Status: ACTIVE | Noted: 2021-05-24

## 2022-10-31 ENCOUNTER — OFFICE VISIT (OUTPATIENT)
Dept: ENDOCRINOLOGY | Age: 37
End: 2022-10-31
Payer: MEDICAID

## 2022-10-31 VITALS
WEIGHT: 163.4 LBS | HEART RATE: 88 BPM | HEIGHT: 63 IN | DIASTOLIC BLOOD PRESSURE: 71 MMHG | BODY MASS INDEX: 28.95 KG/M2 | RESPIRATION RATE: 18 BRPM | SYSTOLIC BLOOD PRESSURE: 118 MMHG | TEMPERATURE: 97.9 F | OXYGEN SATURATION: 97 %

## 2022-10-31 DIAGNOSIS — E04.1 THYROID NODULE: Primary | ICD-10-CM

## 2022-10-31 PROCEDURE — 99204 OFFICE O/P NEW MOD 45 MIN: CPT | Performed by: INTERNAL MEDICINE

## 2022-10-31 NOTE — PATIENT INSTRUCTIONS
SPECIFIC INSTRUCTIONS BELOW     No meds        -------------PAY ATTENTION TO THESE GENERAL INSTRUCTIONS -----------------      - The medications prescribed at this visit will not be available at pharmacy until 6 pm       - YOUR MED LIST IS NOT UP TO DATE AS SOME CHANGES ARE BEING MADE AFTER THE VISIT - FOLLOW SPECIFIC INSTRUCTIONS  ABOVE     -ANY tests other than blood work, which you opt to do  outside the  [facility] facilities, you are responsible for prior authorizations if  required    - [address] [facility]- PLEASE IGNORE     Results     *Normal results will not be notified by a phone call starting January 1 2021   *If you have an upcoming visit, the results will be discussed at the visit   *Please sign up for MY CHART if you want access to your lab and test results  *Abnormal results which require immediate attention will be notified by phone call   *Abnormal results which do not require immediate assistance will be notified in 1-2 weeks       Refills    -    have your pharmacy send us a refill request . Refills are done max for one year and a visit is a must before refills are extended    Follow up appointments -  highly encourage you to make it when you are checking out. We can accommodate you into the schedule based on your clinical situation, but not for extending refills beyond a year. Labs are important to give refills and is important to get labs before the visit     Phone calls  -  Allow  24 hrs.  for non-urgent calls to be returned  Prior authorization - It may take 2-4 weeks to process  Forms  -  FMLA, DMV etc., will take up to 2 weeks to process  Cancellations - please notify the office 2 days in advance   Samples  - will only be dispensed at visits       If not showing for the appointments and cancelling appointments within 24 hours are kept track of and three  of such situations in  two consecutive years will likely be considered for termination from the practice    -------------------------------------------------------------------------------------------------------------------

## 2022-10-31 NOTE — PROGRESS NOTES
1. Have you been to the ER, urgent care clinic since your last visit? No Hospitalized since your last visit? No    2. Have you seen or consulted any other health care providers outside of the [address] [place] [address] since your last visit? Include any pap smears or colon screening.  No    Wt Readings from Last 3 Encounters:   10/31/22 163 lb 6.4 oz (74.1 kg)   05/24/21 157 lb 8 oz (71.4 kg)   03/29/21 157 lb (71.2 kg)     Temp Readings from Last 3 Encounters:   10/31/22 97.9 °F (36.6 °C) (Temporal)   05/24/21 97.7 °F (36.5 °C) (Temporal)   03/29/21 98.1 °F (36.7 °C)     BP Readings from Last 3 Encounters:   10/31/22 118/71   05/24/21 120/70   03/29/21 (!) 144/79     Pulse Readings from Last 3 Encounters:   10/31/22 88   05/24/21 76   03/29/21 89

## 2022-10-31 NOTE — PROGRESS NOTES
HISTORY OF PRESENT ILLNESS  Nirmala Waggoner is a 30 y.o. female. HPI  Initial visit  for   MNG  management and evaluation   Referred by Dr. Claudia King     She went to ER ,  as she felt some pressure in her neck . She could not swallow    She was  referred to ENT.  She had usg  from July 2022     The ultrasound showed the thyroid nodule requiring FNA     Patient continues to feel pressure symptoms   She has no hyper or hypothyroid symptoms     She has no family or personal history of thyroid cancers   No radiation exposure     Generally in good state of health       Past Medical History:   Diagnosis Date    Anemia     Had to get iron infusions while pregnant    Anxiety     Chronic headache     Headache     Idiopathic hypertension     Intermittent palpitations     Nausea & vomiting     Visual disturbance        Social History     Socioeconomic History    Marital status:      Spouse name: Not on file    Number of children: Not on file    Years of education: Not on file    Highest education level: Not on file   Occupational History    Not on file   Tobacco Use    Smoking status: Never    Smokeless tobacco: Never   Vaping Use    Vaping Use: Former    Substances: THC, CBD    Devices: Disposable   Substance and Sexual Activity    Alcohol use: Not Currently    Drug use: Not Currently    Sexual activity: Not on file   Other Topics Concern    Not on file   Social History Narrative    ** Merged History Encounter **          Social Determinants of Health     Financial Resource Strain: Not on file   Food Insecurity: Not on file   Transportation Needs: Not on file   Physical Activity: Not on file   Stress: Not on file   Social Connections: Not on file   Intimate Partner Violence: Not on file   Housing Stability: Not on file       Family History   Problem Relation Age of Onset    Anemia Mother     No Known Problems Father     Hypertension Maternal Grandmother     High Cholesterol Maternal Grandmother        ROS  As above   Physical Exam  Constitutional:       Appearance: She is well-developed. HENT:      Head: Normocephalic. Eyes:      Conjunctiva/sclera: Conjunctivae normal.      Pupils: Pupils are equal, round, and reactive to light. Neck:      Thyroid: Thyromegaly (right side is prominent) present. Vascular: No JVD. Trachea: No tracheal deviation. Cardiovascular:      Rate and Rhythm: Normal rate and regular rhythm. Heart sounds: Normal heart sounds. No murmur heard. Pulmonary:      Breath sounds: Normal breath sounds. Abdominal:      General: Bowel sounds are normal.      Palpations: Abdomen is soft. Musculoskeletal:         General: Normal range of motion. Cervical back: Normal range of motion and neck supple. Lymphadenopathy:      Cervical: No cervical adenopathy. Skin:     General: Skin is warm. Neurological:      Mental Status: She is alert and oriented to person, place, and time. Deep Tendon Reflexes: Reflexes are normal and symmetric. Last Thryoid tests  -   2021 - euthyroid     ASSESSMENT and PLAN    MNG/ right thyroid nodule  :   [facility] -  right lobe - 6.2 cm  and left lobe  5.7 cm   Right nodule measures  2,8 cm by  2.5 cm by  2.8 cm   It is hypoechoic nodule  , taller than wide and smooth margins    We discussed the natural history of thyroid nodules and the fact that only 5% are malignant. .  I reassured her that most thyroid cancers are very slow growing     I offered her  pursuing a fine needle aspiration of the nodule, given  the size of the nodule and some suspicious characteristics on ultrasound, I think it makes most sense to pursue biopsy at this time. she is agreeable to this plan.     - order ultrasound-guided FNA of dominant nodule  - follow up to be determined based on biopsy results    - order TFTs     Reviewed results with patient and discussed the labs being ordered today/bnv  Patient voiced understanding of plan of care

## 2022-10-31 NOTE — LETTER
11/6/2022    Patient: Colleen Coulter   YOB: 1985   Date of Visit: 10/31/2022     Maegan Odonnell MD  [address] [place] [address] Rd  [address] [address]  Via [address] [facility] [address]    Dear Maegan Odonnell MD,      Thank you for referring Ms. Riya Hutchins to [address] [place] [address] for evaluation. My notes for this consultation are attached. If you have questions, please do not hesitate to call me. I look forward to following your patient along with you.       Sincerely,    Rajan Gasca MD

## 2022-11-02 ENCOUNTER — OFFICE VISIT (OUTPATIENT)
Dept: ENDOCRINOLOGY | Age: 37
End: 2022-11-02
Payer: MEDICAID

## 2022-11-02 VITALS
RESPIRATION RATE: 18 BRPM | HEIGHT: 63 IN | DIASTOLIC BLOOD PRESSURE: 65 MMHG | WEIGHT: 166 LBS | TEMPERATURE: 98.2 F | BODY MASS INDEX: 29.41 KG/M2 | OXYGEN SATURATION: 96 % | SYSTOLIC BLOOD PRESSURE: 122 MMHG | HEART RATE: 100 BPM

## 2022-11-02 DIAGNOSIS — E04.1 THYROID NODULE: Primary | ICD-10-CM

## 2022-11-02 LAB
T4 FREE SERPL-MCNC: 1.08 NG/DL (ref 0.82–1.77)
THYROGLOB AB SERPL-ACNC: <1 IU/ML (ref 0–0.9)
THYROPEROXIDASE AB SERPL-ACNC: <8 IU/ML (ref 0–34)
TSH SERPL DL<=0.005 MIU/L-ACNC: 0.86 UIU/ML (ref 0.45–4.5)

## 2022-11-02 PROCEDURE — 10005 FNA BX W/US GDN 1ST LES: CPT | Performed by: INTERNAL MEDICINE

## 2022-11-02 NOTE — PROGRESS NOTES
[facility] DIABETES & ENDOCRINOLOGY  OFFICE PROCEDURE PROGRESS NOTE        Chart reviewed for the following:   Fritz Abrams MD, have reviewed the History, Physical and updated the Allergic reactions for [address] performed immediately prior to start of procedure:   Fritz Abrams MD, have performed the following reviews on Dawn Loya prior to the start of the procedure:            * Patient was identified by name and date of birth   * Agreement on procedure being performed was verified  * Risks and Benefits explained to the patient  * Procedure site verified and marked as necessary  * Patient was positioned for comfort  * Consent was signed and verified     Time: 230 pm    Pain scale : 0    Date of procedure: 11/2/2022    Procedure performed by:  Ana Vallecillo MD    Provider assisted by: Ms. Tito Alejandro LPN        Real time imaging was performed in both transverse and sagittal planes    Nodule size was RIGHT     Following informed consent, a procedural pause was held to confirm patiient identity and site of biopsy. After sterile preparation, FNA guidance was performed using direct ultrasound guidance to confirm accurate needle placement. 5 aspirations were made using 25 G needles  Samples were submitted to cytology  Patient  tolerated procedure well without complications  After care instructions provided.       Pain scale post procedure : 2

## 2022-11-02 NOTE — PATIENT INSTRUCTIONS
SPECIFIC INSTRUCTIONS BELOW       Please take tylenol 500 mg or Motrin 400 mg (2 pills of 200 mg dose) OTC,  if there is any biopsy site pain    You can go back to your normal routine immediately    If you notice any dime sized redness, at the biopsy site, it is normal and do not worry     If you have more symptoms and signs requiring emergency assistance,  call 911   or go to Emergency room         -------------Aelx 1958 -----------------      - The medications prescribed at this visit will not be available at pharmacy until 6 pm       - YOUR MED LIST IS NOT UP TO DATE AS SOME CHANGES ARE BEING MADE AFTER THE VISIT - [address] [place] [address]     -ANY tests other than blood work, which you opt to do  outside the  [facility] imaging facilities, you are responsible for prior authorizations if  required    - HEALTH MAINTENANCE IS NOT GOING TO BE UP TO DATE ON YOUR AVS- PLEASE IGNORE     Results     *Normal results will not be notified by a phone call starting January 1 2021   *If you have an upcoming visit, the results will be discussed at the visit   *Please sign up for MY CHART if you want access to your lab and test results  *Abnormal results which require immediate attention will be notified by phone call   *Abnormal results which do not require immediate assistance will be notified in 1-2 weeks       Refills    -    have your pharmacy send us a refill request . Refills are done max for one year and a visit is a must before refills are extended    Follow up appointments -  highly encourage you to make it when you are checking out. We can accommodate you into the schedule based on your clinical situation, but not for extending refills beyond a year. Labs are important to give refills and is important to get labs before the visit     Phone calls  -  Allow  24 hrs.  for non-urgent calls to be returned  Prior authorization - It may take 2-4 weeks to process  Forms  -  FMLA, DMV etc., will take up to 2 weeks to process  Cancellations - please notify the office 2 days in advance   Samples  - will only be dispensed at visits       If not showing for the appointments and cancelling appointments within 24 hours are kept track of and three  of such situations in  two consecutive years will likely be considered for termination from the practice    -------------------------------------------------------------------------------------------------------------------

## 2022-11-02 NOTE — PROGRESS NOTES
Blaire Hernandez is a 30 y.o. female here for   Chief Complaint   Patient presents with    Thyroid Problem    Biopsy       1. Have you been to the ER, urgent care clinic since your last visit? Hospitalized since your last visit? -no    2. Have you seen or consulted any other health care providers outside of the [address] [place] [address] since your last visit?   Include any pap smears or colon screening.-no

## 2022-11-10 ENCOUNTER — DOCUMENTATION ONLY (OUTPATIENT)
Dept: ENDOCRINOLOGY | Age: 37
End: 2022-11-10

## 2023-02-20 ENCOUNTER — OFFICE VISIT (OUTPATIENT)
Dept: ENDOCRINOLOGY | Age: 38
End: 2023-02-20
Payer: MEDICAID

## 2023-02-20 VITALS
DIASTOLIC BLOOD PRESSURE: 64 MMHG | HEART RATE: 94 BPM | TEMPERATURE: 96.8 F | SYSTOLIC BLOOD PRESSURE: 125 MMHG | HEIGHT: 63 IN | OXYGEN SATURATION: 98 % | WEIGHT: 169.2 LBS | BODY MASS INDEX: 29.98 KG/M2

## 2023-02-20 DIAGNOSIS — E04.1 THYROID NODULE: Primary | ICD-10-CM

## 2023-02-20 PROCEDURE — 99213 OFFICE O/P EST LOW 20 MIN: CPT | Performed by: INTERNAL MEDICINE

## 2023-02-20 NOTE — PROGRESS NOTES
Dorys Soto is a 30 y.o. female here for   Chief Complaint   Patient presents with    Thyroid Problem       1. Have you been to the ER, urgent care clinic since your last visit? Hospitalized since your last visit? - No     2. Have you seen or consulted any other health care providers outside of the [address] [place] [address] since your last visit?   Include any pap smears or colon screening.- Breast Dr Tahir Verduzco inside of [facility] .

## 2023-02-20 NOTE — LETTER
2/20/2023 1:15 PM    Ms.  [address]      Above mentioned patient of mine is  Cleared to undergo planned surgery for breasts        Sincerely,      Domingadrea Benavidez MD

## 2023-02-20 NOTE — PATIENT INSTRUCTIONS
SPECIFIC INSTRUCTIONS BELOW     No meds         -------------PAY ATTENTION TO THESE GENERAL INSTRUCTIONS -----------------      - The medications prescribed at this visit will not be available at pharmacy until 6 pm       - YOUR MED LIST IS NOT UP TO DATE AS SOME CHANGES ARE BEING MADE AFTER THE VISIT - FOLLOW SPECIFIC INSTRUCTIONS  ABOVE     -ANY tests other than blood work, which you opt to do  outside the  [facility] facilities, you are responsible for prior authorizations if  required    - [address] [facility]- PLEASE IGNORE     Results     *Normal results will not be notified by a phone call starting January 1 2021   *If you have an upcoming visit, the results will be discussed at the visit   *Please sign up for MY CHART if you want access to your lab and test results  *Abnormal results which require immediate attention will be notified by phone call   *Abnormal results which do not require immediate assistance will be notified in 1-2 weeks       Refills    -    have your pharmacy send us a refill request . Refills are done max for one year and a visit is a must before refills are extended    Follow up appointments -  highly encourage you to make it when you are checking out. We can accommodate you into the schedule based on your clinical situation, but not for extending refills beyond a year. Labs are important to give refills and is important to get labs before the visit     Phone calls  -  Allow  24 hrs.  for non-urgent calls to be returned  Prior authorization - It may take 2-4 weeks to process  Forms  -  FMLA, DMV etc., will take up to 2 weeks to process  Cancellations - please notify the office 2 days in advance   Samples  - will only be dispensed at visits       If not showing for the appointments and cancelling appointments within 24 hours are kept track of and three  of such situations in  two consecutive years will likely be considered for termination from the practice    -------------------------------------------------------------------------------------------------------------------

## 2023-02-20 NOTE — PROGRESS NOTES
Alexsander Krishnamurthy MD FACE     HISTORY OF PRESENT ILLNESS  Lluvia Shane is a 30 y.o. female. HPI  First follow up after initial visit  for   MNG  management and evaluation   from  October 2022     Pt underwent FNA on right side dominant nodule in office by me in nov 2022  She is told it was benign        Referred by Dr. Merlin Grills   She went to ER ,  as she felt some pressure in her neck . She could not swallow    She was  referred to ENT. She had usg  from July 2022   The ultrasound showed the thyroid nodule requiring FNA   Patient continues to feel pressure symptoms   She has no hyper or hypothyroid symptoms   She has no family or personal history of thyroid cancers   No radiation exposure     Generally in good state of health     Review of Systems   Feels fullness in neck       Physical Exam   Constitutional: She is oriented to person, place, and time. She appears well-developed and well-nourished. Neck: right sided thyromegaly present. Cardiovascular: Normal rate, regular rhythm   Pulmonary/Chest: Breath sounds normal.   Psychiatric: She has a normal mood and affect. Lab Results   Component Value Date/Time    TSH 0.857 10/31/2022 03:20 PM    T4, Free 1.08 10/31/2022 03:20 PM          ASSESSMENT and PLAN    MNG/ right thyroid nodule  :   Reviewed usg from  [facility]   dated july 15 2022 -  right lobe - 6.2 cm  and left lobe  5.7 cm   Right nodule measures  2,8 cm by  2.5 cm by  2.8 cm     Nov 2022  :  ultrasound-guided FNA of dominant nodule on right side ,  negative for cancer   She is euthyroid by labs     Will do usg thyrodi in nov 2023        2.   Bilateral breast reduction surgery  - being planned on     She needed clearance for this           Reviewed results with patient and discussed the labs being ordered today/bnv  Patient voiced understanding of plan of care

## 2023-04-22 DIAGNOSIS — E04.1 THYROID NODULE: Primary | ICD-10-CM

## 2023-04-23 ENCOUNTER — TRANSCRIBE ORDERS (OUTPATIENT)
Facility: HOSPITAL | Age: 38
End: 2023-04-23

## 2023-04-23 DIAGNOSIS — E04.1 THYROID NODULE: Primary | ICD-10-CM

## 2023-04-24 DIAGNOSIS — E04.1 THYROID NODULE: Primary | ICD-10-CM

## 2023-09-27 ENCOUNTER — HOSPITAL ENCOUNTER (OUTPATIENT)
Facility: HOSPITAL | Age: 38
Discharge: HOME | End: 2023-09-30
Attending: INTERNAL MEDICINE
Payer: MEDICAID

## 2023-09-27 DIAGNOSIS — E04.1 THYROID NODULE: ICD-10-CM

## 2023-09-27 PROCEDURE — 76536 US EXAM OF HEAD AND NECK: CPT

## 2023-10-03 ENCOUNTER — TELEPHONE (OUTPATIENT)
Age: 38
End: 2023-10-03

## 2023-10-03 NOTE — TELEPHONE ENCOUNTER
Patient asking for ultrasound results she states she cannot get into her my chart.  Advised physician out of office but will send message

## 2023-10-10 NOTE — TELEPHONE ENCOUNTER
inform  patient  -         IMPRESSION:  Solid/cystic nodules in the right lobe and isthmus, TR 2. No fine-needle aspiration recommended.     Above is the impression of the radiologist     I suggest doing the biopsy of  both nodules , which we do in the office     For more details, I can discuss that at the visit     Or schedule it

## 2023-10-12 NOTE — TELEPHONE ENCOUNTER
I spoke with patient today. Verified patient date of birth and then read Dr. Mariel Peres result note. Patient elected to go ahead schedule biopsy in office.  Appointment given for 11/1/2023

## 2023-11-01 ENCOUNTER — OFFICE VISIT (OUTPATIENT)
Age: 38
End: 2023-11-01

## 2023-11-01 VITALS
DIASTOLIC BLOOD PRESSURE: 66 MMHG | RESPIRATION RATE: 15 BRPM | HEART RATE: 90 BPM | BODY MASS INDEX: 28.88 KG/M2 | TEMPERATURE: 97 F | SYSTOLIC BLOOD PRESSURE: 101 MMHG | HEIGHT: 63 IN | OXYGEN SATURATION: 99 % | WEIGHT: 163 LBS

## 2023-11-01 DIAGNOSIS — E04.2 MULTINODULAR GOITER (NONTOXIC): Primary | ICD-10-CM

## 2023-11-01 NOTE — PROGRESS NOTES
Kerline George MD FACE                 OFFICE PROCEDURE PROGRESS NOTE        Chart reviewed for the following:   Aiden Fisher MD, have reviewed the History, Physical and updated the Allergic reactions for [place] performed immediately prior to start of procedure:   Aiden Fisher MD, have performed the following reviews on Shahid Cotter prior to the start of the procedure:            * Patient was identified by name and date of birth   * Agreement on procedure being performed was verified  * Risks and Benefits explained to the patient  * Procedure site verified and marked as necessary  * Patient was positioned for comfort  * Consent was signed and verified     Time: 330 pm     Pain scale : 0    Date of procedure: 11/1/2023    Procedure performed by:  Gisell Vicente MD    Provider assisted by: Ms. Emelyn Richard LPN        Real time imaging was performed in both transverse and sagittal planes    Nodule size RIGHT 2.6 cm   Following informed consent, a procedural pause was held to confirm patiient identity and site of biopsy. After sterile preparation, FNA guidance was performed using direct ultrasound guidance to confirm accurate needle placement. 4 aspirations were made using 25 G needles  Samples were submitted to cytology  Patient  tolerated procedure well without complications  After care instructions provided. Nodule size ISTHMUS 1.6 cm   Following informed consent, a procedural pause was held to confirm patiient identity and site of biopsy. After sterile preparation, FNA guidance was performed using direct ultrasound guidance to confirm accurate needle placement. 5 aspirations were made using 25 G needles  Samples were submitted to cytology  Patient  tolerated procedure well without complications  After care instructions provided.         Pain scale post procedure : 3

## 2023-11-07 ENCOUNTER — TELEPHONE (OUTPATIENT)
Age: 38
End: 2023-11-07

## 2023-11-07 NOTE — TELEPHONE ENCOUNTER
Reviewed AFIRMA results  from  nov 1 2023     Both nodules right side and isthmic are benign     Inform pt please     Saud Gregg MD

## 2023-11-09 NOTE — TELEPHONE ENCOUNTER
Called patient, verified patient date of birth. Advised patient per Dr. Primo Ellison note that both nodules benign. Patient understood.

## 2023-12-04 ENCOUNTER — NURSE ONLY (OUTPATIENT)
Age: 38
End: 2023-12-04

## 2023-12-04 DIAGNOSIS — E04.2 MULTINODULAR GOITER (NONTOXIC): ICD-10-CM

## 2023-12-06 LAB
T4 FREE SERPL-MCNC: 0.99 NG/DL (ref 0.82–1.77)
TSH SERPL DL<=0.005 MIU/L-ACNC: 0.97 UIU/ML (ref 0.45–4.5)

## 2023-12-11 ENCOUNTER — OFFICE VISIT (OUTPATIENT)
Age: 38
End: 2023-12-11
Payer: MEDICAID

## 2023-12-11 VITALS
TEMPERATURE: 97 F | BODY MASS INDEX: 29.41 KG/M2 | DIASTOLIC BLOOD PRESSURE: 70 MMHG | HEART RATE: 99 BPM | WEIGHT: 166 LBS | HEIGHT: 63 IN | OXYGEN SATURATION: 98 % | RESPIRATION RATE: 18 BRPM | SYSTOLIC BLOOD PRESSURE: 114 MMHG

## 2023-12-11 DIAGNOSIS — E04.2 MULTINODULAR GOITER (NONTOXIC): Primary | ICD-10-CM

## 2023-12-11 PROCEDURE — 99214 OFFICE O/P EST MOD 30 MIN: CPT | Performed by: INTERNAL MEDICINE

## 2023-12-11 NOTE — PROGRESS NOTES
Ashley Corrales MD FACE           HISTORY OF PRESENT ILLNESS   Mei Gordon is a 41  y.o. female. HPI  follow up after last   visit  for   MNG    from  feb 2023     She had FNA   on right thyroid nodule again ,repeat    and   on isthmus in nov 2023   Both resulted to be benign           Feb 2023    Pt underwent FNA on right side dominant nodule in office by me in nov 2022   She is told it was benign            Referred by Dr. Devyn Johnson    She went to ER ,  as she felt some pressure in her neck . She could not swallow     She was  referred to ENT. She had usg  from July 2022    The ultrasound showed the thyroid nodule requiring FNA    Patient continues to feel pressure symptoms    She has no hyper or hypothyroid symptoms    She has no family or personal history of thyroid cancers    No radiation exposure       Generally in good state of health       Review of Systems    Feels fullness in neck          Physical Exam    Constitutional: She is oriented to person, place, and time. She appears well-developed and well-nourished. Neck: right sided thyromegaly present. Psychiatric: She has a normal mood and affect. Labs    Lab Results   Component Value Date    TSH 0.967 12/04/2023    T4FREE 0.99 12/04/2023             ASSESSMENT and PLAN       MNG/ right thyroid nodule  :    Reviewed usg from  [facility]   dated july 15 2022 -  right lobe - 6.2 cm  and left lobe  5.7 cm    Right nodule measures  2,8 cm by  2.5 cm by  2.8 cm   Ultrasound from October 2023  : The right lobe nodule is solid/cystic measuring 1.6 x 1.7 x 2.5 cm. The isthmic nodule is solid and cystic measuring 1.6 x 1.5 x 1.6 cm.    Euthyroid  from  Dec 2023   She does have tight feeling on right side still persisting   Plan for usg in 2-3 years   Discussed CT scan neck as an option for future if she becomes more symptomatic   Discussed surgery is an option but pt finds that it is not

## 2023-12-11 NOTE — PROGRESS NOTES
James Vizcaino is a 41 y.o. female here for   Chief Complaint   Patient presents with    Follow-up     Multinodular goiter       1. Have you been to the ER, urgent care clinic since your last visit? Hospitalized since your last visit? - [facility];  11/28/2023; Both legs having pain; muscle aches; migraine    2. Have you seen or consulted any other health care providers outside of the [address] [place] [address] Avenue since your last visit?   Include any pap smears or colon screening.- no

## 2024-12-10 ENCOUNTER — OFFICE VISIT (OUTPATIENT)
Age: 39
End: 2024-12-10
Payer: MEDICAID

## 2024-12-10 VITALS
HEART RATE: 79 BPM | WEIGHT: 160.8 LBS | TEMPERATURE: 98.2 F | SYSTOLIC BLOOD PRESSURE: 121 MMHG | OXYGEN SATURATION: 96 % | HEIGHT: 63 IN | BODY MASS INDEX: 28.49 KG/M2 | DIASTOLIC BLOOD PRESSURE: 74 MMHG

## 2024-12-10 DIAGNOSIS — E04.2 MULTINODULAR GOITER (NONTOXIC): Primary | ICD-10-CM

## 2024-12-10 PROCEDURE — 99214 OFFICE O/P EST MOD 30 MIN: CPT | Performed by: INTERNAL MEDICINE

## 2024-12-10 NOTE — PATIENT INSTRUCTIONS
SPECIFIC INSTRUCTIONS BELOW             -------------PAY ATTENTION TO THESE GENERAL INSTRUCTIONS -----------------      - The medications prescribed at this visit will not be available at pharmacy until 6 pm today        - your med list is not updated until the visit encounter is closed, so FOLLOW THE TYPED SPECIFIC INSTRUCTIONS  ABOVE     -ANY tests other than blood work, which you opt to do  outside the  [facility] imaging facilities, you are responsible for prior authorizations if  required    - health maintenance will not be updated  on AVS, so please ignore it       Results     *Normal results will not be notified by a phone call starting January 1 2024   *If you have an upcoming visit, the results will be discussed at the visit   *Please sign up for MY CHART if you want access to your lab and test results  *Abnormal results which require immediate attention will be notified by phone call   *Abnormal results which do not require immediate assistance will be notified in 1-2 weeks       Refills    -    have your pharmacy send us a refill request . Refills are done max for one year and a visit is a must before refills are extended    Follow up appointments -  highly encourage you to make it when you are checking out. We can accommodate you into the schedule based on your clinical situation, but not for extending refills beyond a year. Labs are important to give refills and it is important to get labs before the visit     Phone calls  -  Allow  24 hrs. for non-urgent calls to be returned  Prior authorization - It may take 2 to 4 weeks to process  Forms  -  FMLA, DMV etc., will take up to 2 weeks to process  Cancellations - please notify the office 2 days in advance   Samples  - will only be dispensed at visits       If not showing up for the appointment and/ or  cancelling appointment within 24 hours are kept track of and three such situations in  two consecutive years will likely be considered for termination

## 2024-12-10 NOTE — PROGRESS NOTES
[facility] DIABETES AND ENDOCRINOLOGY              Tianna Acuña MD FACE           HISTORY OF PRESENT ILLNESS   Shahid Cotter is a 39   y.o. female.   HPI  YEARLY    follow up after last   visit  for   MNG    from  Dec   2023     She finds that her thyroid nodules are larger   No compressive symptoms         Dec 2023      She had FNA   on right thyroid nodule again ,repeat    and   on isthmus in nov 2023   Both resulted to be benign     Feb 2023    Pt underwent FNA on right side dominant nodule in office by me in nov 2022   She is told it was benign            Referred by Dr. Newton Albarran    She went to ER ,  as she felt some pressure in her neck . She could not swallow     She was  referred to ENT. She had usg  from July 2022    The ultrasound showed the thyroid nodule requiring FNA    Patient continues to feel pressure symptoms    She has no hyper or hypothyroid symptoms    She has no family or personal history of thyroid cancers    No radiation exposure       Generally in good state of health       Review of Systems    Feels fullness in neck          Physical Exam    Constitutional: She is oriented to person, place, and time. She appears well-developed and well-nourished.    Neck: right sided thyromegaly present.     Psychiatric: She has a normal mood and affect.          Labs  Done today          ASSESSMENT and PLAN       MNG/ right thyroid nodule  :    Reviewed usg from  [facility]   dated july 15 2022 -  right lobe - 6.2 cm  and left lobe  5.7 cm    Right nodule measures  2,8 cm by  2.5 cm by  2.8 cm     Dec 2023   Ultrasound from October 2023  : The right lobe nodule is solid/cystic measuring 1.6 x 1.7 x 2.5 cm. The isthmic nodule is solid and cystic measuring 1.6 x 1.5 x 1.6 cm.   Euthyroid  from  Dec 2023   She does have tight feeling on right side still persisting   Plan for usg in 2-3 years     Dec 2024  :   will do usg now as she felt it is getting bigger    Discussed CT scan neck as an option  4 = completely dependent

## 2024-12-10 NOTE — PROGRESS NOTES
Shahid Cotter is a 39 y.o. female here for   Chief Complaint   Patient presents with    Thyroid Problem       1. Have you been to the ER, urgent care clinic since your last visit?  Hospitalized since your last visit? -No    2. Have you seen or consulted any other health care providers outside of the [facility] since your last visit?  Include any pap smears or colon screening.-No

## 2024-12-11 LAB
T4 FREE SERPL-MCNC: 0.89 NG/DL (ref 0.82–1.77)
TSH SERPL DL<=0.005 MIU/L-ACNC: 0.9 UIU/ML (ref 0.45–4.5)

## 2024-12-18 ENCOUNTER — HOSPITAL ENCOUNTER (OUTPATIENT)
Facility: HOSPITAL | Age: 39
Discharge: HOME | End: 2024-12-21
Payer: MEDICAID

## 2024-12-18 DIAGNOSIS — E04.2 MULTINODULAR GOITER (NONTOXIC): ICD-10-CM

## 2024-12-18 PROCEDURE — 76536 US EXAM OF HEAD AND NECK: CPT

## 2024-12-30 ENCOUNTER — TELEPHONE (OUTPATIENT)
Age: 39
End: 2024-12-30

## 2024-12-30 NOTE — TELEPHONE ENCOUNTER
Hi,    Pt is requesting a call back to discuss her recent ultrasound results. Please return call to further discuss.     #[phone]    Thank you.

## 2024-12-30 NOTE — TELEPHONE ENCOUNTER
Attempted to call. Unsuccessful. Left msg for Shahid Cotter to give us a call back at the office. A callback number was left.

## 2024-12-31 NOTE — TELEPHONE ENCOUNTER
Informed pt of Dr. Baron's note. Pt verbalized understanding with no further questions or concerns at this time.

## 2025-06-20 NOTE — PROGRESS NOTES
Alda Due  report from date 2 November 2022    Thyroid nodule from right side 3 cm is benign    Inform patient Patient arricving from airport for dizziness, nausea, and light headedness after what was described as a possible anxiety attack when told she could not fly with her partner.EMS reports difficulty obtaining BP and O2 and administered 5 of droperidol and 15 of toradol for back pain. She had a recent kidney stent placed and hx of breast cancer.

## 2025-09-02 ENCOUNTER — HOSPITAL ENCOUNTER (EMERGENCY)
Facility: HOSPITAL | Age: 40
Discharge: HOME | End: 2025-09-02
Payer: MEDICAID

## 2025-09-02 ENCOUNTER — TELEPHONE (OUTPATIENT)
Age: 40
End: 2025-09-02

## 2025-09-02 ENCOUNTER — APPOINTMENT (OUTPATIENT)
Facility: HOSPITAL | Age: 40
End: 2025-09-02
Payer: MEDICAID

## 2025-09-02 VITALS
RESPIRATION RATE: 18 BRPM | WEIGHT: 174 LBS | HEIGHT: 63 IN | DIASTOLIC BLOOD PRESSURE: 75 MMHG | OXYGEN SATURATION: 99 % | HEART RATE: 95 BPM | SYSTOLIC BLOOD PRESSURE: 123 MMHG | TEMPERATURE: 98.2 F | BODY MASS INDEX: 30.83 KG/M2

## 2025-09-02 DIAGNOSIS — O21.9 VOMITING OF PREGNANCY, ANTEPARTUM: Primary | ICD-10-CM

## 2025-09-02 DIAGNOSIS — O20.8 SUBCHORIONIC HEMORRHAGE OF PLACENTA IN FIRST TRIMESTER: ICD-10-CM

## 2025-09-02 LAB
ABO + RH BLD: NORMAL
ANION GAP SERPL CALC-SCNC: 8 MMOL/L (ref 2–12)
APPEARANCE UR: CLEAR
BACTERIA URNS QL MICRO: ABNORMAL /HPF
BASOPHILS # BLD: 0.04 K/UL (ref 0–0.1)
BASOPHILS NFR BLD: 0.3 % (ref 0–1)
BILIRUB UR QL: NEGATIVE
BUN SERPL-MCNC: 8 MG/DL (ref 6–20)
BUN/CREAT SERPL: 10 (ref 12–20)
CA-I BLD-MCNC: 8.7 MG/DL (ref 8.5–10.1)
CHLORIDE SERPL-SCNC: 100 MMOL/L (ref 97–108)
CO2 SERPL-SCNC: 27 MMOL/L (ref 21–32)
COLOR UR: ABNORMAL
CREAT SERPL-MCNC: 0.78 MG/DL (ref 0.55–1.02)
DIFFERENTIAL METHOD BLD: ABNORMAL
EOSINOPHIL # BLD: 0.06 K/UL (ref 0–0.4)
EOSINOPHIL NFR BLD: 0.4 % (ref 0–7)
EPITH CASTS URNS QL MICRO: ABNORMAL /LPF
ERYTHROCYTE [DISTWIDTH] IN BLOOD BY AUTOMATED COUNT: 14.7 % (ref 11.5–14.5)
GLUCOSE SERPL-MCNC: 90 MG/DL (ref 65–100)
GLUCOSE UR STRIP.AUTO-MCNC: NEGATIVE MG/DL
HCG SERPL-ACNC: ABNORMAL MIU/ML (ref 0–6)
HCT VFR BLD AUTO: 34.3 % (ref 35–47)
HGB BLD-MCNC: 10.7 G/DL (ref 11.5–16)
HGB UR QL STRIP: NEGATIVE
IMM GRANULOCYTES # BLD AUTO: 0.07 K/UL (ref 0–0.04)
IMM GRANULOCYTES NFR BLD AUTO: 0.5 % (ref 0–0.5)
KETONES UR QL STRIP.AUTO: NEGATIVE MG/DL
LEUKOCYTE ESTERASE UR QL STRIP.AUTO: NEGATIVE
LYMPHOCYTES # BLD: 1.65 K/UL (ref 0.8–3.5)
LYMPHOCYTES NFR BLD: 11.7 % (ref 12–49)
MCH RBC QN AUTO: 26.6 PG (ref 26–34)
MCHC RBC AUTO-ENTMCNC: 31.2 G/DL (ref 30–36.5)
MCV RBC AUTO: 85.1 FL (ref 80–99)
MONOCYTES # BLD: 0.56 K/UL (ref 0–1)
MONOCYTES NFR BLD: 4 % (ref 5–13)
NEUTS SEG # BLD: 11.72 K/UL (ref 1.8–8)
NEUTS SEG NFR BLD: 83.1 % (ref 32–75)
NITRITE UR QL STRIP.AUTO: NEGATIVE
PH UR STRIP: 6 (ref 5–8)
PLATELET # BLD AUTO: 322 K/UL (ref 150–400)
PMV BLD AUTO: 10.6 FL (ref 8.9–12.9)
POTASSIUM SERPL-SCNC: 3.8 MMOL/L (ref 3.5–5.1)
PROT UR STRIP-MCNC: NEGATIVE MG/DL
RBC # BLD AUTO: 4.03 M/UL (ref 3.8–5.2)
RBC #/AREA URNS HPF: ABNORMAL /HPF (ref 0–5)
SODIUM SERPL-SCNC: 135 MMOL/L (ref 136–145)
SP GR UR REFRACTOMETRY: 1.02 (ref 1–1.03)
URINE CULTURE IF INDICATED: ABNORMAL
UROBILINOGEN UR QL STRIP.AUTO: 0.1 EU/DL (ref 0.2–1)
WBC # BLD AUTO: 14.1 K/UL (ref 3.6–11)
WBC URNS QL MICRO: ABNORMAL /HPF (ref 0–4)

## 2025-09-02 PROCEDURE — 81001 URINALYSIS AUTO W/SCOPE: CPT

## 2025-09-02 PROCEDURE — 99284 EMERGENCY DEPT VISIT MOD MDM: CPT

## 2025-09-02 PROCEDURE — 6360000002 HC RX W HCPCS: Performed by: NURSE PRACTITIONER

## 2025-09-02 PROCEDURE — 86901 BLOOD TYPING SEROLOGIC RH(D): CPT

## 2025-09-02 PROCEDURE — 96361 HYDRATE IV INFUSION ADD-ON: CPT

## 2025-09-02 PROCEDURE — 36415 COLL VENOUS BLD VENIPUNCTURE: CPT

## 2025-09-02 PROCEDURE — 2580000003 HC RX 258: Performed by: NURSE PRACTITIONER

## 2025-09-02 PROCEDURE — 85025 COMPLETE CBC W/AUTO DIFF WBC: CPT

## 2025-09-02 PROCEDURE — 84702 CHORIONIC GONADOTROPIN TEST: CPT

## 2025-09-02 PROCEDURE — 81025 URINE PREGNANCY TEST: CPT

## 2025-09-02 PROCEDURE — 96374 THER/PROPH/DIAG INJ IV PUSH: CPT

## 2025-09-02 PROCEDURE — 76801 OB US < 14 WKS SINGLE FETUS: CPT

## 2025-09-02 PROCEDURE — 86900 BLOOD TYPING SEROLOGIC ABO: CPT

## 2025-09-02 PROCEDURE — 80048 BASIC METABOLIC PNL TOTAL CA: CPT

## 2025-09-02 RX ORDER — ONDANSETRON 4 MG/1
4 TABLET, ORALLY DISINTEGRATING ORAL 3 TIMES DAILY PRN
Qty: 21 TABLET | Refills: 0 | Status: SHIPPED | OUTPATIENT
Start: 2025-09-02

## 2025-09-02 RX ORDER — 0.9 % SODIUM CHLORIDE 0.9 %
1000 INTRAVENOUS SOLUTION INTRAVENOUS
Status: COMPLETED | OUTPATIENT
Start: 2025-09-02 | End: 2025-09-02

## 2025-09-02 RX ORDER — ONDANSETRON 2 MG/ML
4 INJECTION INTRAMUSCULAR; INTRAVENOUS ONCE
Status: COMPLETED | OUTPATIENT
Start: 2025-09-02 | End: 2025-09-02

## 2025-09-02 RX ORDER — PNV NO.95/FERROUS FUM/FOLIC AC 28MG-0.8MG
1 TABLET ORAL DAILY
Qty: 30 TABLET | Refills: 2 | Status: SHIPPED | OUTPATIENT
Start: 2025-09-02

## 2025-09-02 RX ADMIN — ONDANSETRON 4 MG: 2 INJECTION, SOLUTION INTRAMUSCULAR; INTRAVENOUS at 13:28

## 2025-09-02 RX ADMIN — SODIUM CHLORIDE 1000 ML: 0.9 INJECTION, SOLUTION INTRAVENOUS at 13:32

## 2025-09-02 ASSESSMENT — PAIN SCALES - GENERAL: PAINLEVEL_OUTOF10: 9

## 2025-09-02 ASSESSMENT — LIFESTYLE VARIABLES
HOW OFTEN DO YOU HAVE A DRINK CONTAINING ALCOHOL: NEVER
HOW MANY STANDARD DRINKS CONTAINING ALCOHOL DO YOU HAVE ON A TYPICAL DAY: PATIENT DOES NOT DRINK

## 2025-09-02 ASSESSMENT — PAIN DESCRIPTION - ORIENTATION: ORIENTATION: RIGHT;MID;LOWER

## 2025-09-02 ASSESSMENT — PAIN DESCRIPTION - LOCATION: LOCATION: ABDOMEN

## 2025-09-02 ASSESSMENT — PAIN - FUNCTIONAL ASSESSMENT: PAIN_FUNCTIONAL_ASSESSMENT: 0-10

## 2025-09-03 LAB — HCG UR QL: POSITIVE
